# Patient Record
Sex: FEMALE | Employment: UNEMPLOYED | URBAN - METROPOLITAN AREA
[De-identification: names, ages, dates, MRNs, and addresses within clinical notes are randomized per-mention and may not be internally consistent; named-entity substitution may affect disease eponyms.]

---

## 2024-07-02 ENCOUNTER — LAB (OUTPATIENT)
Dept: LAB | Facility: LAB | Age: 69
End: 2024-07-02
Payer: COMMERCIAL

## 2024-07-02 ENCOUNTER — APPOINTMENT (OUTPATIENT)
Dept: DERMATOLOGY | Facility: CLINIC | Age: 69
End: 2024-07-02
Payer: MEDICARE

## 2024-07-02 DIAGNOSIS — L40.9 PSORIASIS: ICD-10-CM

## 2024-07-02 DIAGNOSIS — Z79.899 HIGH RISK MEDICATION USE: ICD-10-CM

## 2024-07-02 DIAGNOSIS — L40.9 PSORIASIS: Primary | ICD-10-CM

## 2024-07-02 PROCEDURE — 86481 TB AG RESPONSE T-CELL SUSP: CPT

## 2024-07-02 PROCEDURE — 86706 HEP B SURFACE ANTIBODY: CPT

## 2024-07-02 PROCEDURE — 87340 HEPATITIS B SURFACE AG IA: CPT

## 2024-07-02 PROCEDURE — 99204 OFFICE O/P NEW MOD 45 MIN: CPT | Performed by: DERMATOLOGY

## 2024-07-02 PROCEDURE — 86704 HEP B CORE ANTIBODY TOTAL: CPT

## 2024-07-02 PROCEDURE — 1160F RVW MEDS BY RX/DR IN RCRD: CPT | Performed by: DERMATOLOGY

## 2024-07-02 PROCEDURE — 1159F MED LIST DOCD IN RCRD: CPT | Performed by: DERMATOLOGY

## 2024-07-02 PROCEDURE — 36415 COLL VENOUS BLD VENIPUNCTURE: CPT

## 2024-07-02 PROCEDURE — 86803 HEPATITIS C AB TEST: CPT

## 2024-07-02 PROCEDURE — 87389 HIV-1 AG W/HIV-1&-2 AB AG IA: CPT

## 2024-07-02 RX ORDER — ASCORBIC ACID 250 MG
250 TABLET ORAL
COMMUNITY
Start: 2022-12-12

## 2024-07-02 RX ORDER — LANOLIN ALCOHOL/MO/W.PET/CERES
1000 CREAM (GRAM) TOPICAL
COMMUNITY

## 2024-07-02 RX ORDER — GABAPENTIN 100 MG/1
200 CAPSULE ORAL EVERY 8 HOURS PRN
COMMUNITY
Start: 2023-06-30

## 2024-07-02 RX ORDER — DICLOFENAC SODIUM 10 MG/G
2 GEL TOPICAL 4 TIMES DAILY
COMMUNITY
Start: 2024-06-20

## 2024-07-02 RX ORDER — PHENAZOPYRIDINE HYDROCHLORIDE 200 MG/1
200 TABLET, FILM COATED ORAL EVERY 8 HOURS PRN
COMMUNITY
Start: 2023-10-16

## 2024-07-02 RX ORDER — LOSARTAN POTASSIUM 25 MG/1
25 TABLET ORAL DAILY
COMMUNITY

## 2024-07-02 RX ORDER — MECLIZINE HCL 12.5 MG 12.5 MG/1
12.5-25 TABLET ORAL EVERY 8 HOURS PRN
COMMUNITY
Start: 2024-02-01

## 2024-07-02 RX ORDER — ACETAMINOPHEN 500 MG
1000 TABLET ORAL EVERY 8 HOURS PRN
COMMUNITY
Start: 2023-12-19

## 2024-07-02 RX ORDER — TERBINAFINE HYDROCHLORIDE 250 MG/1
1 TABLET ORAL
COMMUNITY
Start: 2023-08-04

## 2024-07-02 RX ORDER — VIBEGRON 75 MG/1
75 TABLET, FILM COATED ORAL
COMMUNITY
Start: 2024-06-06

## 2024-07-02 RX ORDER — MELOXICAM 15 MG/1
15 TABLET ORAL
COMMUNITY
Start: 2023-05-17

## 2024-07-02 RX ORDER — LEVOTHYROXINE SODIUM 112 UG/1
112 TABLET ORAL
COMMUNITY
Start: 2023-07-10

## 2024-07-02 RX ORDER — SUMATRIPTAN 50 MG/1
TABLET, FILM COATED ORAL
COMMUNITY
Start: 2023-12-07

## 2024-07-02 RX ORDER — OMEPRAZOLE 20 MG/1
20 CAPSULE, DELAYED RELEASE ORAL
COMMUNITY
Start: 2024-01-03

## 2024-07-02 RX ORDER — CHOLECALCIFEROL (VITAMIN D3) 25 MCG
1000 TABLET ORAL
COMMUNITY
Start: 2023-09-12

## 2024-07-02 NOTE — PROGRESS NOTES
Subjective     Hoa Keith is a 68 y.o. female who presents for the following: Itching (Itching, denies rash since December since using salt water after moving in with family- pt states has seen another dermatologist and tried oral antibiotic, steroid injection, topical triamcinolone and clobetasol topical and spray with no response. ) and Suspicious Skin Lesion (Right forearm//[id number]  ).     Review of Systems:  No other skin or systemic complaints other than what is documented elsewhere in the note.    The following portions of the chart were reviewed this encounter and updated as appropriate:   Allergies  Meds  Problems  Med Hx  Surg Hx  Fam Hx         Skin Cancer History  No skin cancer on file.      Specialty Problems    None       Objective   Well appearing patient in no apparent distress; mood and affect are within normal limits.    A focused skin examination was performed. All findings within normal limits unless otherwise noted below.    Assessment/Plan   1. Psoriasis  Scalp  Thick erythematous plaques with micaceous scale     Patient presents for evaluation of rash on the posterior scalp since December 2023. Patient notes this is due to salt water exposure in the water after moving in with her daughter. Review of records indicates that the patient has been seeing Dr Colon at [facility], and has tried and failed triamcinolone, clobetasol spray, Otezla and ILK. Patient reports no improvement with these therapies and is looking for a second opinion and to transfer care.  -Discussed that the examination is consistent with psoriasis  -BSA 2% in the scalp  -Since topical therapies and ILK not effective, can consider other systemic treatments besides Otezla (which caused n/v) such as Skyrizi, but I am not sure if insurance will cover these as the patient has low BSA involved.  -Not a candidate for NB UVB as scalp involved  -Patient with one kidney; normal renal function. Having mesh replacement  surgery in August; will not start biologic until after this surgery    -Given refractory nature of psoriasis, recommend to start rx Skyrizi/Risankizumab  -Potential adverse effects of rx Skyrizi/Risankizumab include, but not limited to, increased risk of infections (both cutaneous and systemic), injection site reactions, upper respiratory infections, headaches.   -This medication requires annual blood work monitoring.   -No live vaccines while on this medication.   -Prior to the initiation of systemic immunomodulatory therapy lab work is required, including CBC, CMP, HIV, Hepatitis B/C, Quantiferon gold. Orders placed today. Reviewed CBC and CMP, which are acceptable.   -Upon review of laboratory results (infectious labs ordered today), if permissible, will send rx to begin rx Skyrizi/Risankizumab 150 mg subcutaneously at week 0, week 4, and then every 12 weeks thereafter.   -Educational handout given today on the medication    Entire visit conducted with [employer] #[id number]    Related Procedures  HIV 1/2 Antigen/Antibody Screen with Reflex to Confirmation  Hepatitis C Antibody  Hepatitis B Surface Antigen  Hepatitis B Surface Antibody  Hepatitis B Core Antibody, Total  T-Spot TB    2. High risk medication use    Related Procedures  HIV 1/2 Antigen/Antibody Screen with Reflex to Confirmation  Hepatitis C Antibody  Hepatitis B Surface Antigen  Hepatitis B Surface Antibody  Hepatitis B Core Antibody, Total  T-Spot TB        Follow up in 8 weeks for PsO for continued management of this complex condition.   Discussed if there are any changes or development of concerning symptoms (lesion/skin condition is changing, bleeding, enlarging, or worsening) the patient is to contact my office. The patient verbalizes understanding.    Eva Ren MD  7/2/2024      Addendum: Labs received and reivewed. Permissible to start skyrizi.  Rx for 6 months sent to  Specialty pharmacy. Nurse to notify  patient.    Eva Ren MD  07/05/24

## 2024-07-03 LAB
HBV CORE AB SER QL: NONREACTIVE
HBV SURFACE AB SER-ACNC: <3.1 MIU/ML
HBV SURFACE AG SERPL QL IA: NONREACTIVE
HCV AB SER QL: NONREACTIVE
HIV 1+2 AB+HIV1 P24 AG SERPL QL IA: NONREACTIVE

## 2024-07-05 ENCOUNTER — SPECIALTY PHARMACY (OUTPATIENT)
Dept: PHARMACY | Facility: CLINIC | Age: 69
End: 2024-07-05

## 2024-07-05 ENCOUNTER — TELEPHONE (OUTPATIENT)
Dept: DERMATOLOGY | Facility: CLINIC | Age: 69
End: 2024-07-05
Payer: COMMERCIAL

## 2024-07-05 LAB
NIL(NEG) CONTROL SPOT COUNT: NORMAL
PANEL A SPOT COUNT: 0
PANEL B SPOT COUNT: 0
POS CONTROL SPOT COUNT: NORMAL
T-SPOT. TB INTERPRETATION: NEGATIVE

## 2024-07-05 RX ORDER — RISANKIZUMAB-RZAA 150 MG/ML
150 INJECTION SUBCUTANEOUS
Qty: 1 ML | Refills: 1 | Status: SHIPPED | OUTPATIENT
Start: 2024-07-05

## 2024-07-05 RX ORDER — RISANKIZUMAB-RZAA 150 MG/ML
150 INJECTION SUBCUTANEOUS
Qty: 2 ML | Refills: 0 | Status: SHIPPED | OUTPATIENT
Start: 2024-07-05

## 2024-07-05 NOTE — TELEPHONE ENCOUNTER
Pt called using  to inquire about lab results.  Pt was informed via interperater that labs had not yet been reviewed by Dr. Hughes and once they were the office would call.  Pt stated understanding no further questions noted.     Maxi Jackson LPN

## 2024-07-12 ENCOUNTER — SPECIALTY PHARMACY (OUTPATIENT)
Dept: PHARMACY | Facility: CLINIC | Age: 69
End: 2024-07-12

## 2024-07-22 ENCOUNTER — TELEPHONE (OUTPATIENT)
Dept: DERMATOLOGY | Facility: CLINIC | Age: 69
End: 2024-07-22
Payer: COMMERCIAL

## 2024-07-22 NOTE — TELEPHONE ENCOUNTER
Pt called using  to discuss skyrizi injectable medication.  Pt states that insurance has denied the medication.  Pt was informed that once the office gets the reasoning of denial we will assess that and the MD will see if there is something else she would like to order or if we will appeal decision.  Pt stated understanding via  at this time. No further questions noted.     Maxi Jackson LPN

## 2024-07-24 ENCOUNTER — TELEPHONE (OUTPATIENT)
Dept: DERMATOLOGY | Facility: CLINIC | Age: 69
End: 2024-07-24
Payer: COMMERCIAL

## 2024-07-24 NOTE — TELEPHONE ENCOUNTER
Pt contacted office at this time and states that rash is getting worse and she is very itchy and would like to know if there is anything else that can be sent in for patient.    Please advise?    Maxi Jackson LPN

## 2024-07-26 ENCOUNTER — SPECIALTY PHARMACY (OUTPATIENT)
Dept: PHARMACY | Facility: CLINIC | Age: 69
End: 2024-07-26

## 2024-07-30 ENCOUNTER — APPOINTMENT (OUTPATIENT)
Dept: DERMATOLOGY | Facility: CLINIC | Age: 69
End: 2024-07-30
Payer: MEDICARE

## 2024-08-13 ENCOUNTER — APPOINTMENT (OUTPATIENT)
Dept: DERMATOLOGY | Facility: CLINIC | Age: 69
End: 2024-08-13
Payer: MEDICARE

## 2024-08-13 ENCOUNTER — TELEMEDICINE CLINICAL SUPPORT (OUTPATIENT)
Dept: PHARMACY | Facility: HOSPITAL | Age: 69
End: 2024-08-13

## 2024-08-13 DIAGNOSIS — L40.9 PSORIASIS: Primary | ICD-10-CM

## 2024-08-13 PROCEDURE — G2211 COMPLEX E/M VISIT ADD ON: HCPCS | Performed by: DERMATOLOGY

## 2024-08-13 PROCEDURE — 99214 OFFICE O/P EST MOD 30 MIN: CPT | Performed by: DERMATOLOGY

## 2024-08-13 PROCEDURE — 1159F MED LIST DOCD IN RCRD: CPT | Performed by: DERMATOLOGY

## 2024-08-13 PROCEDURE — 1160F RVW MEDS BY RX/DR IN RCRD: CPT | Performed by: DERMATOLOGY

## 2024-08-13 RX ORDER — BENZONATATE 100 MG/1
1 CAPSULE ORAL EVERY 8 HOURS PRN
COMMUNITY
Start: 2023-12-19

## 2024-08-13 RX ORDER — DEXTROMETHORPHAN HYDROBROMIDE, GUAIFENESIN 30; 600 MG/1; MG/1
1 TABLET, EXTENDED RELEASE ORAL
COMMUNITY
Start: 2024-01-03

## 2024-08-13 RX ORDER — ALENDRONATE SODIUM 70 MG/1
TABLET ORAL
COMMUNITY
Start: 2023-06-19

## 2024-08-13 RX ORDER — ETODOLAC 400 MG/1
400 TABLET, FILM COATED ORAL
COMMUNITY
Start: 2023-08-02

## 2024-08-13 RX ORDER — ONDANSETRON 4 MG/1
4 TABLET, ORALLY DISINTEGRATING ORAL EVERY 8 HOURS PRN
COMMUNITY
Start: 2023-10-02

## 2024-08-13 RX ORDER — DOCUSATE SODIUM 100 MG/1
100 CAPSULE, LIQUID FILLED ORAL EVERY 12 HOURS PRN
COMMUNITY
Start: 2023-10-02

## 2024-08-13 RX ORDER — DEUCRAVACITINIB 6 MG/1
6 TABLET, FILM COATED ORAL
COMMUNITY
Start: 2024-06-25

## 2024-08-13 RX ORDER — ESTRADIOL 0.1 MG/G
1 CREAM VAGINAL 2 TIMES WEEKLY
COMMUNITY
Start: 2023-11-20

## 2024-08-13 RX ORDER — ZINC OXIDE 20 G/100G
OINTMENT TOPICAL 2 TIMES DAILY
COMMUNITY
Start: 2023-07-07

## 2024-08-13 RX ORDER — ASPIRIN 81 MG/1
81 TABLET ORAL 2 TIMES DAILY
COMMUNITY
Start: 2023-05-16

## 2024-08-13 RX ORDER — HYDROCODONE BITARTRATE AND ACETAMINOPHEN 5; 325 MG/1; MG/1
1 TABLET ORAL EVERY 8 HOURS PRN
COMMUNITY
Start: 2024-08-09 | End: 2024-08-14

## 2024-08-13 RX ORDER — LORATADINE 10 MG/1
10 TABLET ORAL
COMMUNITY
Start: 2023-08-01

## 2024-08-13 RX ORDER — CYCLOBENZAPRINE HCL 10 MG
10 TABLET ORAL 3 TIMES DAILY
COMMUNITY
Start: 2024-07-30

## 2024-08-13 NOTE — PROGRESS NOTES
Subjective     Hoa Keith is a 68 y.o. female who presents for the following: Psoriasis (Scalp- pt is here to discuss treatment options d/t pt not wanting to do skyrizi bc its an injection. Pt states got Sotyktu today. / [id number] ).     Review of Systems:  No other skin or systemic complaints other than what is documented elsewhere in the note.    The following portions of the chart were reviewed this encounter and updated as appropriate:   Allergies  Meds  Problems  Med Hx  Surg Hx  Fam Hx         Skin Cancer History  No skin cancer on file.      Specialty Problems    None       Objective   Well appearing patient in no apparent distress; mood and affect are within normal limits.    A focused skin examination was performed. All findings within normal limits unless otherwise noted below.    Assessment/Plan   1. Psoriasis  Scalp  Thick erythematous plaques with micaceous scale posterior scalp    At last visit, after discussion patient desired to start Skyrizi. This was approved and then patient decided they did not want to proceed with injectable form of therapy. Patient received Sotyktu pills and wants to start these, states I prescribed them. Discussed with the patient that I did not prescribe Sotyktu, and showed patient this was prescribed by a dermatologist at [facility] on the pill bottle label. Discussed the side effect profile of JESÚS inhibitors. Patient declines to proceed with Sotyktu at this time. Discussed with patient treatment options, including injectables, jesús inhibitors, methotrexate, etc. After discussion, patient wishes to proceed with Skyrizi initiation. Patient wishes to come to the office for injections as lives alone.  -Notified specialty pharmacy that patient wishes to start medication and to have medication shipped to the patient  -Patient to bring in Skyrizi medicaiton for injection; patient scheduled on nurse's schedule today for 2 weeks time    Visit conducted today with GAUTAM   ID [id number]      Prior History: Patient presents for evaluation of rash on the posterior scalp since December 2023. Patient notes this is due to salt water exposure in the water after moving in with her daughter. Review of records indicates that the patient has been seeing Dr Colon at [facility], and has tried and failed triamcinolone, clobetasol spray, Otezla and ILK. Patient reports no improvement with these therapies and is looking for a second opinion and to transfer care.  -Discussed that the examination is consistent with psoriasis  -BSA 2% in the scalp  -Since topical therapies and ILK not effective, can consider other systemic treatments besides Otezla (which caused n/v) such as Skyrizi, but I am not sure if insurance will cover these as the patient has low BSA involved.  -Not a candidate for NB UVB as scalp involved  -Patient with one kidney; normal renal function. Having mesh replacement surgery in August; will not start biologic until after this surgery    -Given refractory nature of psoriasis, recommend to start rx Skyrizi/Risankizumab  -Potential adverse effects of rx Skyrizi/Risankizumab include, but not limited to, increased risk of infections (both cutaneous and systemic), injection site reactions, upper respiratory infections, headaches.   -This medication requires annual blood work monitoring.   -No live vaccines while on this medication.   -Prior to the initiation of systemic immunomodulatory therapy lab work is required, including CBC, CMP, HIV, Hepatitis B/C, Quantiferon gold. Orders placed today. Reviewed CBC and CMP, which are acceptable.   -Upon review of laboratory results (infectious labs ordered today), if permissible, will send rx to begin rx Skyrizi/Risankizumab 150 mg subcutaneously at week 0, week 4, and then every 12 weeks thereafter.   -Educational handout given today on the medication        Related Medications  risankizumab-rzaa (Skyrizi) 150 mg/mL pen injector  pen  Inject 1 mL (150 mg) under the skin at week 0 and week 4 for loading doses.    risankizumab-rzaa (Skyrizi) 150 mg/mL pen injector pen  Inject 1 mL (150 mg) under the skin every 3 months.        Follow up in 3 months for PsO (Skyrizi) for continued management of this complex condition.    Discussed if there are any changes or development of concerning symptoms (lesion/skin condition is changing, bleeding, enlarging, or worsening) the patient is to contact my office. The patient verbalizes understanding.    Eva Ren MD  8/13/2024

## 2024-08-14 PROCEDURE — RXMED WILLOW AMBULATORY MEDICATION CHARGE

## 2024-08-15 ENCOUNTER — PHARMACY VISIT (OUTPATIENT)
Dept: PHARMACY | Facility: CLINIC | Age: 69
End: 2024-08-15
Payer: COMMERCIAL

## 2024-08-16 ENCOUNTER — SPECIALTY PHARMACY (OUTPATIENT)
Dept: PHARMACY | Facility: CLINIC | Age: 69
End: 2024-08-16

## 2024-08-16 NOTE — PROGRESS NOTES
[facility] Clinical Note    Hoa Keith is a 68 y.o. female, who is on the specialty pharmacy service for management of:  Dermatology Core.    Hoa Keith is taking: Skyrizi.    Medication Receipt Date: not yet received, out for delivery 8/16/24  Medication Start Date (planned or actual): nurse visit scheduled 8/28/24 for first dose    Hoa was contacted on 8/16/2024 at 12:42 PM for a virtual pharmacy visit with encounter number [id number] from:   [facility]  [address] [place] [id number]  Dept: [phone]  Dept Fax: [phone]    Hoa was offered a Telemedicine Video visit or Telephone visit.  Hoa consented to a telephone visit, which was performed.     The most recent encounter visit with the referring prescriber Eva Ren MD  on 8/13/24was reviewed.  Pharmacy will continue to collaborate in the care of this patient with the referring prescriber Eav Ren MD .    General Assessment      Impression/Plan  IMPRESSION/PLAN:  Is patient high risk (potential patients:  pregnancy, geriatric, pediatric)?  no  Is laboratory follow-up needed? no  Is a clinical intervention needed? no  Next reassessment date? ~ 4 months  Additional comments:     Refer to the encounter summary report for documentation details about patient counseling and education.      Medication Adherence    The importance of adherence was discussed with the patient and they were advised to take the medication as prescribed by their provider. Patient was encouraged to call their physician's office if they have a question regarding a missed dose.        Patient was advised to contact the pharmacy if there are any changes to their medication list, including prescriptions, OTC medications, herbal products, or supplements. Patient was advised of [facility]'s dispensing process, refill timeline, contact information  ([phone]), and patient management follow up. Patient confirmed understanding of education conducted during assessment. All patient questions and concerns were addressed to the best of my ability. Patient was encouraged to contact the specialty pharmacy with any questions or concerns.    Confirmed follow-up outreaches are properly scheduled and reviewed goals of therapy with the patient.        POLLO JENSEN, PharmD  [place] [facility]  Completed under the supervision of a  Specialty Pharmacy Clinical Pharmacist    Note cosigned by Ashkan Reyes, PharmD, CSP

## 2024-08-27 ENCOUNTER — APPOINTMENT (OUTPATIENT)
Dept: DERMATOLOGY | Facility: CLINIC | Age: 69
End: 2024-08-27
Payer: MEDICARE

## 2024-08-28 ENCOUNTER — APPOINTMENT (OUTPATIENT)
Dept: DERMATOLOGY | Facility: CLINIC | Age: 69
End: 2024-08-28
Payer: COMMERCIAL

## 2024-08-28 DIAGNOSIS — L40.9 PSORIASIS: Primary | ICD-10-CM

## 2024-08-28 PROCEDURE — 96372 THER/PROPH/DIAG INJ SC/IM: CPT | Performed by: DERMATOLOGY

## 2024-08-28 NOTE — PROGRESS NOTES
Subjective     Hoa Keith is a 68 y.o. female who presents for the following: Injections (First loading dose of Skyrizi injection administered today. Pt hard of hearing,  used via [employer]. Advised pt to contact office with concerns or side effects. Pt to return in 1 month for second loading dose. ).     Review of Systems:  No other skin or systemic complaints other than what is documented elsewhere in the note.    The following portions of the chart were reviewed this encounter and updated as appropriate:        Skin Cancer History  No skin cancer on file.    Specialty Problems    None       Objective   Well appearing patient in no apparent distress; mood and affect are within normal limits.    A focused skin examination was performed. All findings within normal limits unless otherwise noted below.    Assessment/Plan   1. Psoriasis    Related Medications  risankizumab-rzaa (Skyrizi) 150 mg/mL pen injector pen  Inject 1 mL (150 mg) under the skin at week 0 and week 4 for loading doses.    risankizumab-rzaa (Skyrizi) 150 mg/mL pen injector pen  Inject 1 mL (150 mg) under the skin every 3 months.

## 2024-09-25 ENCOUNTER — APPOINTMENT (OUTPATIENT)
Dept: DERMATOLOGY | Facility: CLINIC | Age: 69
End: 2024-09-25
Payer: COMMERCIAL

## 2024-09-25 DIAGNOSIS — L40.9 PSORIASIS: Primary | ICD-10-CM

## 2024-09-25 PROCEDURE — 96372 THER/PROPH/DIAG INJ SC/IM: CPT | Performed by: DERMATOLOGY

## 2024-09-25 NOTE — PROGRESS NOTES
Subjective     Hoa Keith is a 68 y.o. female who presents for the following: Injections (Pt presents to office for 4 week loading dose of Skyrizi. Pt denies questions or concerns at this time. Pt aware to return in 12 weeks for next injection.  used via [employer] at today's visit. ).     Review of Systems:  No other skin or systemic complaints other than what is documented elsewhere in the note.    The following portions of the chart were reviewed this encounter and updated as appropriate:        Skin Cancer History  No skin cancer on file.    Specialty Problems    None       Objective   Well appearing patient in no apparent distress; mood and affect are within normal limits.    A focused skin examination was performed. All findings within normal limits unless otherwise noted below.    Assessment/Plan   1. Psoriasis    Related Medications  risankizumab-rzaa (Skyrizi) 150 mg/mL pen injector pen  Inject 1 mL (150 mg) under the skin at week 0 and week 4 for loading doses.    risankizumab-rzaa (Skyrizi) 150 mg/mL pen injector pen  Inject 1 mL (150 mg) under the skin every 3 months.

## 2024-11-11 ENCOUNTER — APPOINTMENT (OUTPATIENT)
Dept: DERMATOLOGY | Facility: CLINIC | Age: 69
End: 2024-11-11
Payer: COMMERCIAL

## 2024-12-04 ENCOUNTER — SPECIALTY PHARMACY (OUTPATIENT)
Dept: PHARMACY | Facility: CLINIC | Age: 69
End: 2024-12-04

## 2024-12-06 ENCOUNTER — TELEPHONE (OUTPATIENT)
Dept: DERMATOLOGY | Facility: CLINIC | Age: 69
End: 2024-12-06
Payer: COMMERCIAL

## 2024-12-06 NOTE — TELEPHONE ENCOUNTER
Pt contacted office and states that her insurance was discontinued in September and she is unsure how she is going to receive her next skyrizi injection.      Can someone please reach out to patient regarding next steps?    Thank you!    Maxi Jackson LPN

## 2024-12-10 ENCOUNTER — APPOINTMENT (OUTPATIENT)
Dept: DERMATOLOGY | Facility: CLINIC | Age: 69
End: 2024-12-10
Payer: COMMERCIAL

## 2024-12-18 ENCOUNTER — APPOINTMENT (OUTPATIENT)
Dept: DERMATOLOGY | Facility: CLINIC | Age: 69
End: 2024-12-18
Payer: COMMERCIAL

## 2024-12-18 ENCOUNTER — CLINICAL SUPPORT (OUTPATIENT)
Dept: DERMATOLOGY | Facility: CLINIC | Age: 69
End: 2024-12-18
Payer: COMMERCIAL

## 2024-12-18 DIAGNOSIS — L40.9 PSORIASIS: ICD-10-CM

## 2024-12-18 PROCEDURE — 96372 THER/PROPH/DIAG INJ SC/IM: CPT | Performed by: DERMATOLOGY

## 2024-12-18 NOTE — PROGRESS NOTES
Subjective     Hoa Keith is a 68 y.o. female who presents for the following: Injections (Pt presents for skyrizi injection.).     Review of Systems:  No other skin or systemic complaints other than what is documented elsewhere in the note.    The following portions of the chart were reviewed this encounter and updated as appropriate:          Skin Cancer History  No skin cancer on file.      Specialty Problems    None       Objective   Well appearing patient in no apparent distress; mood and affect are within normal limits.    A focused skin examination was performed. All findings within normal limits unless otherwise noted below.    Assessment/Plan   1. Psoriasis    Related Medications  risankizumab-rzaa (Skyrizi) 150 mg/mL pen injector pen  Inject 1 mL (150 mg) under the skin at week 0 and week 4 for loading doses.    risankizumab-rzaa (Skyrizi) 150 mg/mL pen injector pen  Inject 1 mL (150 mg) under the skin every 3 months.

## 2025-01-13 ENCOUNTER — TELEPHONE (OUTPATIENT)
Dept: DERMATOLOGY | Facility: CLINIC | Age: 70
End: 2025-01-13
Payer: COMMERCIAL

## 2025-01-13 NOTE — TELEPHONE ENCOUNTER
Patient called our office and left a message inquiring about Skyrizi delivery date. Messaged generated and sent to  Specialty pharmacy to contact the patient about their medication.    Eva Ren MD  01/13/25

## 2025-01-18 DIAGNOSIS — L40.9 PSORIASIS: ICD-10-CM

## 2025-01-18 DIAGNOSIS — Z79.899 HIGH RISK MEDICATION USE: ICD-10-CM

## 2025-01-19 RX ORDER — RISANKIZUMAB-RZAA 150 MG/ML
150 INJECTION SUBCUTANEOUS
Qty: 1 ML | Refills: 1 | Status: SHIPPED | OUTPATIENT
Start: 2025-01-19

## 2025-01-25 ENCOUNTER — SPECIALTY PHARMACY (OUTPATIENT)
Dept: PHARMACY | Facility: CLINIC | Age: 70
End: 2025-01-25

## 2025-01-28 ENCOUNTER — APPOINTMENT (OUTPATIENT)
Dept: DERMATOLOGY | Facility: CLINIC | Age: 70
End: 2025-01-28
Payer: COMMERCIAL

## 2025-02-01 ENCOUNTER — SPECIALTY PHARMACY (OUTPATIENT)
Dept: PHARMACY | Facility: CLINIC | Age: 70
End: 2025-02-01

## 2025-02-08 PROCEDURE — RXMED WILLOW AMBULATORY MEDICATION CHARGE

## 2025-02-13 ENCOUNTER — SPECIALTY PHARMACY (OUTPATIENT)
Dept: PHARMACY | Facility: CLINIC | Age: 70
End: 2025-02-13

## 2025-02-13 NOTE — PROGRESS NOTES
"[facility] Clinical Note  Patient Reassessment     Introduction  Hoa Keith is a 69 y.o. female who is on the specialty pharmacy service for management of: Dermatology Core.      [facility]: risankizumab-rzaa (Skyrizi) 150 mg/mL pen injector pen  Inject 1 pen (150 mg) under the skin every 3 months.     Duration of therapy: Maintenance    The most recent encounter visit with the referring prescriber  Eva Ren MD  on 08/13/24 was reviewed.  Pharmacy will continue to collaborate in the care of this patient with the referring prescriber.    Discussion  Hoa was contacted on 2/13/2025 at 10:10 AM for a pharmacy visit with encounter number [id number] from:   [facility]  [address] [place] [id number]  Dept: [phone]  Dept Fax: [phone]  Hoa consented to a/an Telephone visit, which was performed.    Efficacy  Patient has developed new symptoms of condition: No  Patient/caregiver feels medication is affecting the disease state: Patient reports that the medication is working well. No recent flares. No use of ancillary agents at this time. Patient notes only having 3 dots on her right arm.    Goals  Provided education on goals and possible outcomes of therapy:  Adherence with therapy  Timely completion of appropriate labs  Timely and appropriate follow up with provider  Identify and address medication interactions with presciption medications, OTC medications and supplements  Optimize or maintain quality of life  Dermatology: Prevent or reduce disease flares  Reduce use of topical agents (corticosteroids or other \"prn\" agents)  Reduction of plaque size, thickness and body surface area (PSO)  Patient has documented target(s) for goals of therapy: Yes  Patient status for goal(s): On track    Tolerance  Patient has experienced side effects from this medication: No  Changes to current therapy regimen: No    The " follow-up timeline was discussed. Every person responds to and reacts to therapy differently. Patient should be assessed for efficacy and tolerability in approximately: 6 months    Adherence  Patient Information  Informant: Self (Patient)  Demonstrates Understanding of Importance of Adherence: Yes  Does the patient have any barriers to self-administration (including physical and mental?): Yes  Barriers to Self-Administration: Prefers to come into the office for injections  Action Taken to Mitigate Barriers for Self-Administration: Patient states she comes into the dermatology office for her injections  Medication Information  Medication: risankizumab-rzaa (Skyrizi)  Patient Reported Missed Doses in the Last 4 Weeks: 0  Estimated Medication Adherence Level: Good  Adherence Estimation Source: Claims history  Barriers to Adherence: No Problems identified   The importance of adherence was discussed and patient/caregiver was advised to take the medication as prescribed by their provider. Encouraged patient/caregiver to call physician's office or specialty pharmacy if they have a question regarding a missed dose.    General Assessment  Changes to home medications, OTCs or supplements: No  Current Outpatient Medications   Medication Sig Dispense Refill    acetaminophen (Tylenol) 500 mg tablet Take 2 tablets (1,000 mg) by mouth every 8 hours if needed.      alendronate (Fosamax) 70 mg tablet TAKE 1 TABLET BY MOUTH ONE TIME A WEEK IN THE MORNING WITH A GLASS OF WATER ON AN EMPTY STOMACH. NOTHING BY MOUTH OR LYING DOWN FOR 30 MINUTES      ascorbic acid (Vitamin C) 250 mg tablet Take 1 tablet (250 mg) by mouth once daily.      aspirin 81 mg EC tablet Take 1 tablet (81 mg) by mouth twice a day.      benzonatate (Tessalon) 100 mg capsule Take 1 capsule (100 mg) by mouth every 8 hours if needed for cough.      cholecalciferol (Vitamin D-3) 25 MCG (1000 UT) tablet Take 1 tablet (1,000 Units) by mouth once daily.       cyanocobalamin (Vitamin B-12) 1,000 mcg tablet Take 1 tablet (1,000 mcg) by mouth once daily.      cyclobenzaprine (Flexeril) 10 mg tablet Take 1 tablet (10 mg) by mouth 3 times a day.      diclofenac sodium (Voltaren) 1 % gel Apply 2.25 inches (2 g) topically 4 times a day.      docusate sodium (Colace) 100 mg capsule Take 1 capsule (100 mg) by mouth every 12 hours if needed.      estradiol (Estrace) 0.01 % (0.1 mg/gram) vaginal cream Insert 0.25 Applicatorfuls (1 g) into the vagina 2 times a week.      etodolac (Lodine) 400 mg tablet Take 1 tablet (400 mg) by mouth.      gabapentin (Neurontin) 100 mg capsule Take 2 capsules (200 mg) by mouth every 8 hours if needed.      Gemtesa 75 mg tablet Take 1 tablet (75 mg) by mouth once daily.      levothyroxine (Synthroid, Levoxyl) 112 mcg tablet Take 1 tablet (112 mcg) by mouth once daily.      loratadine (Claritin) 10 mg tablet Take 1 tablet (10 mg) by mouth once daily.      losartan (Cozaar) 25 mg tablet Take 1 tablet (25 mg) by mouth once daily.      meclizine (Antivert) 12.5 mg tablet Take 1-2 tablets (12.5-25 mg) by mouth every 8 hours if needed.      meloxicam (Mobic) 15 mg tablet Take 1 tablet (15 mg) by mouth once daily.      mineral oil-hydrophilic petrolatum (Aquaphor) ointment Apply topically.      Mucus DM  mg 12 hr tablet Take 1 tablet by mouth every 12 hours.      omeprazole (PriLOSEC) 20 mg DR capsule Take 1 capsule (20 mg) by mouth once daily.      ondansetron ODT (Zofran-ODT) 4 mg disintegrating tablet Take 1 tablet (4 mg) by mouth every 8 hours if needed.      phenazopyridine (Pyridium) 200 mg tablet Take 1 tablet (200 mg) by mouth every 8 hours if needed.      risankizumab-rzaa (Skyrizi) 150 mg/mL pen injector pen Inject 1 mL (150 mg) under the skin at week 0 and week 4 for loading doses. (Patient not taking: Reported on 8/13/2024) 2 mL 0    risankizumab-rzaa (Skyrizi) 150 mg/mL pen injector pen Inject 1 pen (150 mg) under the skin every 3 months.  "1 mL 1    Sotyktu 6 mg tablet Take 1 tablet (6 mg) by mouth once daily.      SUMAtriptan (Imitrex) 50 mg tablet TAKE 1 TABLET BY MOUTH AT ONSET OF HEADACHE. MAY REPEAT IN 2 HOURS AS DIRECTED. MAX OF 2 TABLETS IN 24 HOURS      terbinafine (LamISIL) 250 mg tablet Take 1 tablet (250 mg) by mouth early in the morning..      zinc oxide 20 % ointment Apply topically twice a day.       No current facility-administered medications for this visit.     Reported new allergies: No  Reported new medical conditions: No  Additional monitoring reviewed: N/A  Is laboratory follow up needed? No    Advised to contact the pharmacy if there are any changes to the patient's medication list, including prescriptions, OTC medications, herbal products, or supplements.    Impression/Plan  This patient has been identified as high risk due to Hearing impaired.  The following action was taken: Patient/caregiver encouraged to participate in patient management program and Engaged patient support system.    QOL/Patient Satisfaction  Rate your quality of life on scale of 1-10:  (Pt replied \"normal\")  Rate your satisfaction with  Specialty Pharmacy on scale of 1-10: 10 - Completely satisfied    Provided contact information ([phone]) for [facility] and reviewed dispensing process, refill timeline and patient management follow up. Confirmed understanding of education conducted during assessment. All questions and concerns were addressed and patient/caregiver was encouraged to reach out for additional questions or concerns.    Based on the patient's diagnosis, medication list, progress towards goals, adherence, tolerance, and medication list, medication remains appropriate: Therapy remains appropriate (I attest)    Anju Stroud, PharmD    "

## 2025-02-24 ENCOUNTER — PHARMACY VISIT (OUTPATIENT)
Dept: PHARMACY | Facility: CLINIC | Age: 70
End: 2025-02-24
Payer: COMMERCIAL

## 2025-03-19 ENCOUNTER — APPOINTMENT (OUTPATIENT)
Dept: DERMATOLOGY | Facility: CLINIC | Age: 70
End: 2025-03-19
Payer: COMMERCIAL

## 2025-03-19 DIAGNOSIS — L40.9 PSORIASIS: ICD-10-CM

## 2025-03-19 PROCEDURE — 96372 THER/PROPH/DIAG INJ SC/IM: CPT | Performed by: DERMATOLOGY

## 2025-03-19 NOTE — PROGRESS NOTES
Subjective     Hoa Keith is a 69 y.o. female who presents for the following: Nurse Visit (Patient here for Skyrizi injection. Injection given in right thigh. Patient tolerated well.).     Review of Systems:  No other skin or systemic complaints other than what is documented elsewhere in the note.    The following portions of the chart were reviewed this encounter and updated as appropriate:          Skin Cancer History  No skin cancer on file.      Specialty Problems    None       Objective   Well appearing patient in no apparent distress; mood and affect are within normal limits.      Assessment/Plan   1. Psoriasis    Related Medications  risankizumab-rzaa (Skyrizi) 150 mg/mL pen injector pen  Inject 1 mL (150 mg) under the skin at week 0 and week 4 for loading doses.    risankizumab-rzaa (Skyrizi) 150 mg/mL pen injector pen  Inject 1 pen (150 mg) under the skin every 3 months.    risankizumab-rzaa (Skyrizi) syringe 150 mg

## 2025-04-08 ENCOUNTER — APPOINTMENT (OUTPATIENT)
Dept: DERMATOLOGY | Facility: CLINIC | Age: 70
End: 2025-04-08
Payer: COMMERCIAL

## 2025-04-08 DIAGNOSIS — L65.9 ALOPECIA: ICD-10-CM

## 2025-04-08 DIAGNOSIS — L98.9 DERMATOLOGIC PROBLEM: ICD-10-CM

## 2025-04-08 DIAGNOSIS — Z79.899 HIGH RISK MEDICATION USE: ICD-10-CM

## 2025-04-08 DIAGNOSIS — L40.9 PSORIASIS: Primary | ICD-10-CM

## 2025-04-08 PROCEDURE — 1159F MED LIST DOCD IN RCRD: CPT | Performed by: DERMATOLOGY

## 2025-04-08 PROCEDURE — 99213 OFFICE O/P EST LOW 20 MIN: CPT | Performed by: DERMATOLOGY

## 2025-04-08 RX ORDER — AMLODIPINE BESYLATE 5 MG/1
1 TABLET ORAL
COMMUNITY
Start: 2025-03-27

## 2025-04-08 NOTE — PROGRESS NOTES
Subjective     Hoa Keith is a 69 y.o. female who presents for the following: Psoriasis (Scalp- Pt taking skyrizi - pt unsure if scalp is improving or not. ) and Hair/Scalp Problem (Pt states has noticed her hair is falling out more, comes and goes for 5 months. States thinks it has been improving but would like to discuss. ).     Review of Systems:  No other skin or systemic complaints other than what is documented elsewhere in the note.    The following portions of the chart were reviewed this encounter and updated as appropriate:          Skin Cancer History  No skin cancer on file.         Objective   Well appearing patient in no apparent distress; mood and affect are within normal limits.    A focused skin examination was performed. All findings within normal limits unless otherwise noted below.    Assessment/Plan   1. Psoriasis  Scalp  Scalp is clear today!    Patient here to follow up after initiation of Skyrizi August 28, 2025.   -Complete clearance of psoriasis in the scalp  -Tolerating Skryizi well without any side effects  -Patient wants to continue to get injections here and have medication shipped here to the office  -Labs due in July 2025; lab slip given today    -Given refractory nature of psoriasis, recommend to continue rx Skyrizi/Risankizumab  -Potential adverse effects of rx Skyrizi/Risankizumab include, but not limited to, increased risk of infections (both cutaneous and systemic), injection site reactions, upper respiratory infections, headaches.   -This medication requires annual blood work monitoring.   -No live vaccines while on this medication.   -This medication requires annual blood work monitoring, including CBC, CMP, HIV, Hepatitis B/C, Quantiferon gold. Orders placed today.  -Upon review of laboratory results, if permissible, will send rx to continue rx Skyrizi/Risankizumab 150 mg subcutaneously every 12 weeks.     One injection rx sent to  Specialty today; once labs received and  reviewed, will send rx for another year      Prior History: Patient presents for evaluation of rash on the posterior scalp since December 2023. Patient notes this is due to salt water exposure in the water after moving in with her daughter. Review of records indicates that the patient has been seeing Dr Colon at [facility], and has tried and failed triamcinolone, clobetasol spray, Otezla and ILK. Patient reports no improvement with these therapies and is looking for a second opinion and to transfer care.  -Discussed that the examination is consistent with psoriasis  -BSA 2% in the scalp  -Since topical therapies and ILK not effective, can consider other systemic treatments besides Otezla (which caused n/v) such as Skyrizi, but I am not sure if insurance will cover these as the patient has low BSA involved.  -Not a candidate for NB UVB as scalp involved  -Patient with one kidney; normal renal function. Having mesh replacement surgery in August; will not start biologic until after this surgery    -Given refractory nature of psoriasis, recommend to start rx Skyrizi/Risankizumab  -Potential adverse effects of rx Skyrizi/Risankizumab include, but not limited to, increased risk of infections (both cutaneous and systemic), injection site reactions, upper respiratory infections, headaches.   -This medication requires annual blood work monitoring.   -No live vaccines while on this medication.   -Prior to the initiation of systemic immunomodulatory therapy lab work is required, including CBC, CMP, HIV, Hepatitis B/C, Quantiferon gold. Orders placed today. Reviewed CBC and CMP, which are acceptable.   -Upon review of laboratory results (infectious labs ordered today), if permissible, will send rx to begin rx Skyrizi/Risankizumab 150 mg subcutaneously at week 0, week 4, and then every 12 weeks thereafter.   -Educational handout given today on the medication    Related Procedures  Comprehensive Metabolic Panel  Lipid Panel  HIV 1/2  Antigen/Antibody Screen with Reflex to Confirmation  CBC and Auto Differential  Hepatitis C Antibody  Hepatitis B Surface Antigen  Hepatitis B Surface Antibody  Hepatitis B Core Antibody, Total  T-Spot TB    Related Medications  risankizumab-rzaa (Skyrizi) 150 mg/mL pen injector pen  Inject 1 mL (150 mg) under the skin at week 0 and week 4 for loading doses.    risankizumab-rzaa (Skyrizi) 150 mg/mL pen injector pen  Inject 1 pen (150 mg) under the skin every 3 months.    risankizumab-rzaa (Skyrizi) syringe 150 mg      2. Alopecia  Scalp  Normal scalp exam    -Patient notes increased hair shedding for the last 4-5 months  -Normal exam  -Temporally may correlate to patient's scalp inflammation/starting Skyrizi  -Recommend observation    3. High risk medication use    Related Procedures  Comprehensive Metabolic Panel  Lipid Panel  HIV 1/2 Antigen/Antibody Screen with Reflex to Confirmation  CBC and Auto Differential  Hepatitis C Antibody  Hepatitis B Surface Antigen  Hepatitis B Surface Antibody  Hepatitis B Core Antibody, Total  T-Spot TB    Related Medications  risankizumab-rzaa (Skyrizi) 150 mg/mL pen injector pen  Inject 1 mL (150 mg) under the skin at week 0 and week 4 for loading doses.    risankizumab-rzaa (Skyrizi) 150 mg/mL pen injector pen  Inject 1 pen (150 mg) under the skin every 3 months.    4. Dermatologic problem    Related Procedures  Follow Up In Dermatology - Established Patient  Follow Up In Dermatology - Nurse Visit      Visit conducted today with [facility]  ID [id number]    Follow up in 1 year for PsO (Skyrizi)  Discussed if there are any changes or development of concerning symptoms (lesion/skin condition is changing, bleeding, enlarging, or worsening) the patient is to contact my office. The patient verbalizes understanding.    Eva Ren MD  4/8/2025

## 2025-04-11 LAB
ALBUMIN SERPL-MCNC: 4 G/DL (ref 3.6–5.1)
ALP SERPL-CCNC: 118 U/L (ref 37–153)
ALT SERPL-CCNC: 15 U/L (ref 6–29)
ANION GAP SERPL CALCULATED.4IONS-SCNC: 12 MMOL/L (CALC) (ref 7–17)
AST SERPL-CCNC: 22 U/L (ref 10–35)
BASOPHILS # BLD AUTO: 31 CELLS/UL (ref 0–200)
BASOPHILS NFR BLD AUTO: 0.4 %
BILIRUB SERPL-MCNC: 0.5 MG/DL (ref 0.2–1.2)
BUN SERPL-MCNC: 20 MG/DL (ref 7–25)
CALCIUM SERPL-MCNC: 9.4 MG/DL (ref 8.6–10.4)
CHLORIDE SERPL-SCNC: 106 MMOL/L (ref 98–110)
CHOLEST SERPL-MCNC: 200 MG/DL
CHOLEST/HDLC SERPL: 4.3 (CALC)
CO2 SERPL-SCNC: 22 MMOL/L (ref 20–32)
CREAT SERPL-MCNC: 0.7 MG/DL (ref 0.6–1)
EGFRCR SERPLBLD CKD-EPI 2021: 92 ML/MIN/1.73M2
EOSINOPHIL # BLD AUTO: 169 CELLS/UL (ref 15–500)
EOSINOPHIL NFR BLD AUTO: 2.2 %
ERYTHROCYTE [DISTWIDTH] IN BLOOD BY AUTOMATED COUNT: 12.3 % (ref 11–15)
GLUCOSE SERPL-MCNC: 87 MG/DL (ref 65–99)
HBV CORE AB SERPL QL IA: REACTIVE
HBV SURFACE AB SERPL IA-ACNC: <5 MIU/ML
HBV SURFACE AG SERPL QL IA: NORMAL
HCT VFR BLD AUTO: 40.4 % (ref 35–45)
HCV AB SERPL QL IA: NORMAL
HDLC SERPL-MCNC: 47 MG/DL
HGB BLD-MCNC: 13.5 G/DL (ref 11.7–15.5)
HIV 1+2 AB+HIV1 P24 AG SERPL QL IA: NORMAL
IGNF NEG CNTRL BLD: NORMAL
LDLC SERPL CALC-MCNC: 131 MG/DL (CALC)
LYMPHOCYTES # BLD AUTO: 1401 CELLS/UL (ref 850–3900)
LYMPHOCYTES NFR BLD AUTO: 18.2 %
M TB IFN-G BLD-IMP: NEGATIVE
MCH RBC QN AUTO: 29.9 PG (ref 27–33)
MCHC RBC AUTO-ENTMCNC: 33.4 G/DL (ref 32–36)
MCV RBC AUTO: 89.4 FL (ref 80–100)
MITOGEN IGNF.SPOT COUNT BLD: NORMAL
MONOCYTES # BLD AUTO: 570 CELLS/UL (ref 200–950)
MONOCYTES NFR BLD AUTO: 7.4 %
NEUTROPHILS # BLD AUTO: 5529 CELLS/UL (ref 1500–7800)
NEUTROPHILS NFR BLD AUTO: 71.8 %
NONHDLC SERPL-MCNC: 153 MG/DL (CALC)
PLATELET # BLD AUTO: 236 THOUSAND/UL (ref 140–400)
PMV BLD REES-ECKER: 10.2 FL (ref 7.5–12.5)
POTASSIUM SERPL-SCNC: 4.6 MMOL/L (ref 3.5–5.3)
PROT SERPL-MCNC: 6.9 G/DL (ref 6.1–8.1)
QUEST PANEL A SPOT COUNT: 0
QUEST PANEL B SPOT COUNT: 0
RBC # BLD AUTO: 4.52 MILLION/UL (ref 3.8–5.1)
SODIUM SERPL-SCNC: 140 MMOL/L (ref 135–146)
TRIGL SERPL-MCNC: 113 MG/DL
WBC # BLD AUTO: 7.7 THOUSAND/UL (ref 3.8–10.8)

## 2025-05-27 ENCOUNTER — TELEPHONE (OUTPATIENT)
Dept: DERMATOLOGY | Facility: CLINIC | Age: 70
End: 2025-05-27
Payer: COMMERCIAL

## 2025-05-27 ENCOUNTER — SPECIALTY PHARMACY (OUTPATIENT)
Dept: PHARMACY | Facility: CLINIC | Age: 70
End: 2025-05-27

## 2025-05-27 PROCEDURE — RXMED WILLOW AMBULATORY MEDICATION CHARGE

## 2025-05-27 NOTE — TELEPHONE ENCOUNTER
Specialty pharmacy called and states that pt notified  specialty pharmacy that her Skyrizi is to be sent to our location prior to her appointment in July to perform an injection.  Specialty pharmacy inquires if Skyrizi can be sent to office in 2 weeks so that pt has it for appointment. RN informed  Specialty pharmacy this is acceptable to do as we have a fridge to hold pt's medications. RN verified office address and drop off time (8am-4pm) for June 10th with  Specialty pharmacy.  specialty pharmacy representative verbalized understanding and has no further questions or concerns.     Heather Munoz BSN RN

## 2025-06-02 ENCOUNTER — TELEPHONE (OUTPATIENT)
Dept: DERMATOLOGY | Facility: CLINIC | Age: 70
End: 2025-06-02
Payer: COMMERCIAL

## 2025-06-02 NOTE — TELEPHONE ENCOUNTER
Pt called and states there is a note in My Chart about having an injection on June 10th and she already had an appointment in July on the 7th. RN informed pt that the Skyrizi injection will be arriving to our office on June 10th so that it is available for her July 7th appointment. RN notified pt to please come to appointment on 07.07.25 for the Skyrizi injection to be performed and June 10th is the delivery of the Skyrizi injection- no actual injection will be performed June 10th. Patient verbalized understanding and has no further questions or concerns.     Heather PONCEN RN

## 2025-06-07 ENCOUNTER — PHARMACY VISIT (OUTPATIENT)
Dept: PHARMACY | Facility: CLINIC | Age: 70
End: 2025-06-07
Payer: COMMERCIAL

## 2025-07-01 ENCOUNTER — TELEPHONE (OUTPATIENT)
Dept: DERMATOLOGY | Facility: CLINIC | Age: 70
End: 2025-07-01
Payer: COMMERCIAL

## 2025-07-01 NOTE — TELEPHONE ENCOUNTER
Pt called and left a voicemail stating pt had a question about a medication she is to refill. RN called pt and pt notified she was able to verify the medication with her PCP and it was related to headaches- not dermatology. RN informed pt she may call if any other questions arise. Patient verbalized understanding and has no further questions or concerns.     Heather POOLE RN

## 2025-07-07 ENCOUNTER — APPOINTMENT (OUTPATIENT)
Dept: DERMATOLOGY | Facility: CLINIC | Age: 70
End: 2025-07-07
Payer: COMMERCIAL

## 2025-07-07 DIAGNOSIS — L40.9 PSORIASIS: ICD-10-CM

## 2025-07-07 DIAGNOSIS — L98.9 DERMATOLOGIC PROBLEM: ICD-10-CM

## 2025-07-07 PROCEDURE — 96372 THER/PROPH/DIAG INJ SC/IM: CPT | Performed by: NURSE PRACTITIONER

## 2025-07-07 NOTE — PROGRESS NOTES
Subjective     Hoa Keith is a 69 y.o. female who presents for the following: Nurse Visit (Skyrizi injecction on pt's L outer thigh.).          Review of Systems:  No other skin or systemic complaints other than what is documented elsewhere in the note.    The following portions of the chart were reviewed this encounter and updated as appropriate:          Skin Cancer History  Biopsy Log Book  No skin cancers from Specimen Tracking.    Additional History      Specialty Problems    None       Objective   Well appearing patient in no apparent distress; mood and affect are within normal limits.      Assessment/Plan   Skin Exam  1. DERMATOLOGIC PROBLEM    Pt in for Skyrizi injection on L outer thigh. Pt tolerated injection well. No complications with injection. Site covered with bandaid.  Related Procedures  Follow Up In Dermatology - Nurse Visit

## 2025-07-08 ENCOUNTER — APPOINTMENT (OUTPATIENT)
Dept: DERMATOLOGY | Facility: CLINIC | Age: 70
End: 2025-07-08
Payer: COMMERCIAL

## 2025-08-04 ENCOUNTER — APPOINTMENT (OUTPATIENT)
Dept: DERMATOLOGY | Facility: CLINIC | Age: 70
End: 2025-08-04
Payer: COMMERCIAL

## 2025-08-04 DIAGNOSIS — L98.9 DERMATOLOGIC PROBLEM: ICD-10-CM

## 2025-08-04 DIAGNOSIS — Z79.899 HIGH RISK MEDICATION USE: ICD-10-CM

## 2025-08-04 DIAGNOSIS — D48.5 NEOPLASM OF UNCERTAIN BEHAVIOR OF SKIN: Primary | ICD-10-CM

## 2025-08-04 DIAGNOSIS — R23.4 FISSURE IN SKIN: ICD-10-CM

## 2025-08-04 DIAGNOSIS — L40.9 PSORIASIS: ICD-10-CM

## 2025-08-04 PROCEDURE — 1159F MED LIST DOCD IN RCRD: CPT | Performed by: DERMATOLOGY

## 2025-08-04 PROCEDURE — 11302 SHAVE SKIN LESION 1.1-2.0 CM: CPT | Performed by: DERMATOLOGY

## 2025-08-04 PROCEDURE — 1160F RVW MEDS BY RX/DR IN RCRD: CPT | Performed by: DERMATOLOGY

## 2025-08-04 PROCEDURE — 99213 OFFICE O/P EST LOW 20 MIN: CPT | Performed by: DERMATOLOGY

## 2025-08-04 RX ORDER — RISANKIZUMAB-RZAA 150 MG/ML
150 INJECTION SUBCUTANEOUS
Qty: 1 ML | Refills: 1 | Status: SHIPPED | OUTPATIENT
Start: 2025-08-04

## 2025-08-04 RX ORDER — ROSUVASTATIN CALCIUM 20 MG/1
20 TABLET, COATED ORAL NIGHTLY
COMMUNITY
Start: 2025-06-09

## 2025-08-04 RX ORDER — HYDROCHLOROTHIAZIDE 12.5 MG/1
1 CAPSULE ORAL
COMMUNITY
Start: 2025-07-16

## 2025-08-04 RX ORDER — AMLODIPINE BESYLATE 2.5 MG/1
1 TABLET ORAL
COMMUNITY
Start: 2025-07-16

## 2025-08-04 RX ORDER — RIMEGEPANT SULFATE 75 MG/75MG
TABLET, ORALLY DISINTEGRATING ORAL
COMMUNITY

## 2025-08-04 NOTE — Clinical Note
(lesions of patient's concern)  -Discussed nature of diagnosis  -Discussed these scabs should heal if manipulation is discontinued

## 2025-08-06 DIAGNOSIS — C44.519 BASAL CELL CARCINOMA (BCC) OF SKIN OF OTHER PART OF TORSO: Primary | ICD-10-CM

## 2025-08-06 LAB
LABORATORY COMMENT REPORT: NORMAL
PATH REPORT.FINAL DX SPEC: NORMAL
PATH REPORT.GROSS SPEC: NORMAL
PATH REPORT.MICROSCOPIC SPEC OTHER STN: NORMAL
PATH REPORT.RELEVANT HX SPEC: NORMAL
PATH REPORT.TOTAL CANCER: NORMAL

## 2025-08-12 ENCOUNTER — SPECIALTY PHARMACY (OUTPATIENT)
Dept: PHARMACY | Facility: CLINIC | Age: 70
End: 2025-08-12

## 2025-08-21 ENCOUNTER — APPOINTMENT (OUTPATIENT)
Dept: DERMATOLOGY | Facility: CLINIC | Age: 70
End: 2025-08-21
Payer: COMMERCIAL

## 2025-08-21 VITALS — HEART RATE: 75 BPM | SYSTOLIC BLOOD PRESSURE: 120 MMHG | DIASTOLIC BLOOD PRESSURE: 72 MMHG

## 2025-08-21 DIAGNOSIS — C44.519 BASAL CELL CARCINOMA (BCC) OF SKIN OF OTHER PART OF TORSO: ICD-10-CM

## 2025-08-21 PROCEDURE — 17313 MOHS 1 STAGE T/A/L: CPT | Performed by: STUDENT IN AN ORGANIZED HEALTH CARE EDUCATION/TRAINING PROGRAM

## 2025-08-21 PROCEDURE — 13101 CMPLX RPR TRUNK 2.6-7.5 CM: CPT | Performed by: STUDENT IN AN ORGANIZED HEALTH CARE EDUCATION/TRAINING PROGRAM

## 2025-08-27 ENCOUNTER — TELEPHONE (OUTPATIENT)
Dept: DERMATOLOGY | Facility: CLINIC | Age: 70
End: 2025-08-27
Payer: COMMERCIAL

## 2025-08-27 ENCOUNTER — SPECIALTY PHARMACY (OUTPATIENT)
Dept: PHARMACY | Facility: CLINIC | Age: 70
End: 2025-08-27

## 2025-08-27 PROCEDURE — RXMED WILLOW AMBULATORY MEDICATION CHARGE

## 2025-09-03 ENCOUNTER — PHARMACY VISIT (OUTPATIENT)
Dept: PHARMACY | Facility: CLINIC | Age: 70
End: 2025-09-03
Payer: COMMERCIAL

## 2025-10-06 ENCOUNTER — APPOINTMENT (OUTPATIENT)
Dept: DERMATOLOGY | Facility: CLINIC | Age: 70
End: 2025-10-06
Payer: COMMERCIAL

## 2025-11-04 ENCOUNTER — APPOINTMENT (OUTPATIENT)
Dept: DERMATOLOGY | Facility: CLINIC | Age: 70
End: 2025-11-04
Payer: COMMERCIAL

## 2026-04-13 ENCOUNTER — APPOINTMENT (OUTPATIENT)
Dept: DERMATOLOGY | Facility: CLINIC | Age: 71
End: 2026-04-13
Payer: COMMERCIAL